# Patient Record
(demographics unavailable — no encounter records)

---

## 2025-02-26 NOTE — HEALTH RISK ASSESSMENT
[Good] : ~his/her~  mood as  good [2 - 4 times a month (2 pts)] : 2-4 times a month (2 points) [1 or 2 (0 pts)] : 1 or 2 (0 points) [Never (0 pts)] : Never (0 points) [No] : In the past 12 months have you used drugs other than those required for medical reasons? No [No falls in past year] : Patient reported no falls in the past year [0] : 2) Feeling down, depressed, or hopeless: Not at all (0) [PHQ-2 Negative - No further assessment needed] : PHQ-2 Negative - No further assessment needed [Audit-CScore] : 2 [[id number]] : 0 [Yes] : Reviewed medication list for presence of high-risk medications. [Current] : Current [0-4] : 0-4 [NO] : No [HIV test declined] : HIV test declined [Hepatitis C test declined] : Hepatitis C test declined [Change in mental status noted] : No change in mental status noted [Language] : denies difficulty with language [Behavior] : denies difficulty with behavior [Learning/Retaining New Information] : denies difficulty learning/retaining new information [Handling Complex Tasks] : denies difficulty handling complex tasks [Reasoning] : denies difficulty with reasoning [Spatial Ability and Orientation] : denies difficulty with spatial ability and orientation [With Family] : lives with family [Employed] : employed [Significant Other] : lives with significant other [Sexually Active] : sexually active [High Risk Behavior] : no high risk behavior [Feels Safe at Home] : Feels safe at home [Fully functional (bathing, dressing, toileting, transferring, walking, feeding)] : Fully functional (bathing, dressing, toileting, transferring, walking, feeding) [Fully functional (using the telephone, shopping, preparing meals, housekeeping, doing laundry, using] : Fully functional and needs no help or supervision to perform IADLs (using the telephone, shopping, preparing meals, housekeeping, doing laundry, using transportation, managing medications and managing finances) [Reports changes in hearing] : Reports no changes in hearing [Reports changes in dental health] : Reports no changes in dental health [Smoke Detector] : smoke detector [Safety elements used in home] : safety elements used in home [Seat Belt] :  uses seat belt [Sunscreen] : uses sunscreen

## 2025-02-26 NOTE — ASSESSMENT
[FreeTextEntry1] : 42 year is here for a CPE. She was counselled on diet and exercise, drug and alcohol use, age appropriate health care maintenance including vaccines, seatbelts, sunscreen, stress reduction and safe sex. All questions asked/answered to the best of my ability. Labs sent. Pt referred for DERM and MAMMO. Discussed options for tobacco cessation. Pt elects to go online and obtain free patches from the city gov. If she changes her mind and wants Zyban, she will let me know.

## 2025-02-26 NOTE — HISTORY OF PRESENT ILLNESS
[de-identified] : 41 y/o female presents for CPE and to establish care.  Diangosed with Hashimoto's many years ago. Took 75 mcg 6 days a week for many years, Then, While pregnant, was on 88 mcg. She has been on 75 mcg daily since 5/24 (post-cesarian).  Due for DERM screen (skin cancer screen and skin tags).  Current occasional tobacco use. Uses as "calming mechanism" after stillbirth.

## 2025-02-26 NOTE — PHYSICAL EXAM
[No Acute Distress] : no acute distress [Well Nourished] : well nourished [Well Developed] : well developed [Well-Appearing] : well-appearing [Normal Sclera/Conjunctiva] : normal sclera/conjunctiva [PERRL] : pupils equal round and reactive to light [EOMI] : extraocular movements intact [Normal Outer Ear/Nose] : the outer ears and nose were normal in appearance [Normal Oropharynx] : the oropharynx was normal [Normal TMs] : both tympanic membranes were normal [No JVD] : no jugular venous distention [No Lymphadenopathy] : no lymphadenopathy [Supple] : supple [Thyroid Normal, No Nodules] : the thyroid was normal and there were no nodules present [No Respiratory Distress] : no respiratory distress  [No Accessory Muscle Use] : no accessory muscle use [Clear to Auscultation] : lungs were clear to auscultation bilaterally [Normal Rate] : normal rate  [Regular Rhythm] : with a regular rhythm [Normal S1, S2] : normal S1 and S2 [No Murmur] : no murmur heard [No Carotid Bruits] : no carotid bruits [No Abdominal Bruit] : a ~M bruit was not heard ~T in the abdomen [No Varicosities] : no varicosities [Pedal Pulses Present] : the pedal pulses are present [No Edema] : there was no peripheral edema [No Palpable Aorta] : no palpable aorta [No Extremity Clubbing/Cyanosis] : no extremity clubbing/cyanosis [Normal Appearance] : normal in appearance [No Nipple Discharge] : no nipple discharge [No Axillary Lymphadenopathy] : no axillary lymphadenopathy [Soft] : abdomen soft [Non Tender] : non-tender [Non-distended] : non-distended [No Masses] : no abdominal mass palpated [No HSM] : no HSM [Normal Bowel Sounds] : normal bowel sounds [Normal Posterior Cervical Nodes] : no posterior cervical lymphadenopathy [Normal Anterior Cervical Nodes] : no anterior cervical lymphadenopathy [No CVA Tenderness] : no CVA  tenderness [No Spinal Tenderness] : no spinal tenderness [No Joint Swelling] : no joint swelling [Grossly Normal Strength/Tone] : grossly normal strength/tone [No Rash] : no rash [Coordination Grossly Intact] : coordination grossly intact [No Focal Deficits] : no focal deficits [Normal Gait] : normal gait [Deep Tendon Reflexes (DTR)] : deep tendon reflexes were 2+ and symmetric [Normal Affect] : the affect was normal [Alert and Oriented x3] : oriented to person, place, and time [Normal Insight/Judgement] : insight and judgment were intact

## 2025-05-28 NOTE — ASSESSMENT
[FreeTextEntry1] : #Skin cancer screening  Sun protection reviewed. The patient was educated regarding appropriate sun protection measures, including wearing sunscreen with SPF 30 or higher when outdoors, sun protective clothing, and sun avoidance.  #Cherry angioma #Benign appearing nevi #Seborrheic keratosis Patient was reassured of the benign nature of these findings. No further treatment needed at this time. If any lesion changes or becomes symptomatic I recommend follow-up  #Vitiligo 2% BSA- R vulva, R hand Hx hashimotos on levothyroxine Counselling provided.  -start opzelura cream bid to AA  rtc 3 months  #Hair thinning - scalp and eyebrows -will follow up for hair loss consultation   RTC 12 weeks for vitiligo or sooner prn

## 2025-05-28 NOTE — PHYSICAL EXAM
[FreeTextEntry3] : Exam of external genitalia was deferred.  -On the trunk and upper/lower extremities bilaterally, are multiple scattered tan to brown macules and papules with regular borders. Some of these were examined dermoscopically and had reassuring features. -Scattered on the trunk and extremities are several cherry red papules. -Scattered tan waxy/keratotic, stuck-on appearing papules/plaques with pseudohorn cysts.  With dermoscopy, milia-like cysts and comedo-like openings are noted. - R vulva, R dorsal hand and 2 and 3rd fingers: depigmented macules and patches

## 2025-05-28 NOTE — HISTORY OF PRESENT ILLNESS
[FreeTextEntry1] : NPA - FBSE [de-identified] : Amol Sosa 43 y/o F presents for FBSE.   # whitish discoloration r vulva and R dorsal hand/ fingers, noticed 6 months ago.  Prior to that, had episode of genital itching, that went away w/ clobetasol that her OBGYN prescribed. itching has not recurred has Hashimotos.  #hair thinning, scalp and including eyebrows. (mentioned towards end of visit)  Personal history of skin cancer: No Family history of skin cancer: No History of blistering sunburns: No History of tanning bed use: No  Uses sunscreen regularly: Yes